# Patient Record
Sex: MALE | URBAN - METROPOLITAN AREA
[De-identification: names, ages, dates, MRNs, and addresses within clinical notes are randomized per-mention and may not be internally consistent; named-entity substitution may affect disease eponyms.]

---

## 2017-04-06 ENCOUNTER — EMERGENCY (EMERGENCY)
Facility: HOSPITAL | Age: 32
LOS: 0 days | Discharge: HOME | End: 2017-04-06
Attending: EMERGENCY MEDICINE | Admitting: EMERGENCY MEDICINE
Payer: COMMERCIAL

## 2017-04-06 VITALS
TEMPERATURE: 98 F | SYSTOLIC BLOOD PRESSURE: 138 MMHG | WEIGHT: 225.09 LBS | HEART RATE: 74 BPM | DIASTOLIC BLOOD PRESSURE: 80 MMHG | HEIGHT: 68 IN | OXYGEN SATURATION: 100 % | RESPIRATION RATE: 17 BRPM

## 2017-04-06 DIAGNOSIS — L08.9 LOCAL INFECTION OF THE SKIN AND SUBCUTANEOUS TISSUE, UNSPECIFIED: ICD-10-CM

## 2017-04-06 DIAGNOSIS — M79.644 PAIN IN RIGHT FINGER(S): ICD-10-CM

## 2017-04-06 PROCEDURE — 73130 X-RAY EXAM OF HAND: CPT | Mod: 26,RT

## 2017-04-06 PROCEDURE — 99282 EMERGENCY DEPT VISIT SF MDM: CPT

## 2017-04-06 RX ADMIN — Medication 1 TABLET(S): at 15:05

## 2017-04-06 NOTE — ED STATDOCS - OBJECTIVE STATEMENT
30 y/o M presents to ED c/o finger injury x2 weeks to right third digit. Was scratched by a cat 2 weeks ago and states he doesn't feel like his finger is healing. Unsure if there is a splinter in the wound. Denies fever and has no other constitutional complaints at this time.

## 2017-04-06 NOTE — ED STATDOCS - NS ED MD SCRIBE ATTENDING SCRIBE SECTIONS
PAST MEDICAL/SURGICAL/SOCIAL HISTORY/PHYSICAL EXAM/PROGRESS NOTE/REVIEW OF SYSTEMS/DISPOSITION/RESULTS/CONSULTATIONS/SHIFT CHANGE/HISTORY OF PRESENT ILLNESS

## 2017-04-06 NOTE — ED STATDOCS - ATTENDING CONTRIBUTION TO CARE
I performed the initial face to face bedside interview with this patient regarding history of present illness, review of symptoms and past medical, social and family history.  I completed an independent physical examination.  I was the initial provider who evaluated this patient.  The history, review of symptoms and examination was documented by the PA in my presence and I attest to the accuracy of the documentation.  I have signed out the follow up of any pending tests (i.e. labs, radiological studies) to the PA.  I have discussed the patient’s plan of care and disposition with the PA.     I personally performed the services described in the documentation, reviewed the documentation recorded by the scribe in my presence and it accurately and completely records my words and action.

## 2017-04-06 NOTE — ED ADULT NURSE NOTE - OBJECTIVE STATEMENT
Pt states he was scratched by a cat several weeks ago and the area, R 3rd finger is still bothering him

## 2017-04-06 NOTE — ED STATDOCS - MEDICAL DECISION MAKING DETAILS
wound from cat scratch, r/o FB, treat infection wound from cat scratch, r/o FB, treat infection    No foreign body on exam and XR.  Pt okay for d/c home.  Rx abx.  F/u with PCP in 1 week.  Return precautions given.  Pt understands and agrees with plan.

## 2019-05-02 ENCOUNTER — EMERGENCY (EMERGENCY)
Facility: HOSPITAL | Age: 34
LOS: 0 days | Discharge: HOME | End: 2019-05-02
Attending: EMERGENCY MEDICINE | Admitting: EMERGENCY MEDICINE
Payer: COMMERCIAL

## 2019-05-02 VITALS
WEIGHT: 205.03 LBS | HEART RATE: 101 BPM | DIASTOLIC BLOOD PRESSURE: 90 MMHG | TEMPERATURE: 98 F | OXYGEN SATURATION: 100 % | SYSTOLIC BLOOD PRESSURE: 150 MMHG | HEIGHT: 68 IN | RESPIRATION RATE: 19 BRPM
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
## 2019-05-02 DIAGNOSIS — R07.9 CHEST PAIN, UNSPECIFIED: ICD-10-CM

## 2019-05-02 DIAGNOSIS — M54.9 DORSALGIA, UNSPECIFIED: ICD-10-CM

## 2019-05-02 DIAGNOSIS — S89.82XA OTHER SPECIFIED INJURIES OF LEFT LOWER LEG, INITIAL ENCOUNTER: ICD-10-CM

## 2019-05-02 DIAGNOSIS — M25.532 PAIN IN LEFT WRIST: ICD-10-CM

## 2019-05-02 DIAGNOSIS — S90.32XA CONTUSION OF LEFT FOOT, INITIAL ENCOUNTER: ICD-10-CM

## 2019-05-02 PROCEDURE — 99283 EMERGENCY DEPT VISIT LOW MDM: CPT

## 2019-05-02 PROCEDURE — 73630 X-RAY EXAM OF FOOT: CPT | Mod: 26,LT

## 2019-05-02 PROCEDURE — 73110 X-RAY EXAM OF WRIST: CPT | Mod: 26,LT

## 2019-05-02 PROCEDURE — 71046 X-RAY EXAM CHEST 2 VIEWS: CPT | Mod: 26

## 2019-05-02 RX ORDER — IBUPROFEN 200 MG
600 TABLET ORAL ONCE
Qty: 0 | Refills: 0 | Status: COMPLETED | OUTPATIENT
Start: 2019-05-02 | End: 2019-05-02

## 2019-05-02 RX ADMIN — Medication 600 MILLIGRAM(S): at 21:50
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
## 2019-05-02 NOTE — ED STATDOCS - ATTENDING CONTRIBUTION TO CARE
Attending Contribution to Care: I, Angie Espana, performed the initial face to face bedside interview with this patient regarding history of present illness, review of symptoms and relevant past medical, social and family history.  I completed an independent physical examination.  I was the initial provider who evaluated this patient. I have signed out the follow up of any pending tests (i.e. labs, radiological studies) to the ACP.  I have communicated the patient’s plan of care and disposition with the ACP.

## 2019-05-02 NOTE — ED STATDOCS - CLINICAL SUMMARY MEDICAL DECISION MAKING FREE TEXT BOX
Plan for CXR, left wrist x-ray and left foot x-ray. Pain control. Plan for CXR, left wrist x-ray and left foot x-ray. Pain control.    No fx on xray, symptom care at home reviewed with patient.

## 2019-05-02 NOTE — ED STATDOCS - PROGRESS NOTE DETAILS
Patient seen and evaluated with ED attending at intake.  No acute fracture on xray.  Reviewed symptom care at home and PMD f/u -Kaila Sheldon PA-C
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
## 2020-10-15 ENCOUNTER — TRANSCRIPTION ENCOUNTER (OUTPATIENT)
Age: 35
End: 2020-10-15

## 2023-07-07 ENCOUNTER — EMERGENCY (EMERGENCY)
Facility: HOSPITAL | Age: 38
LOS: 0 days | Discharge: HOME | End: 2023-07-08
Attending: EMERGENCY MEDICINE
Payer: OTHER MISCELLANEOUS

## 2023-07-07 VITALS — WEIGHT: 229.94 LBS | HEIGHT: 68 IN
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
## 2023-07-07 DIAGNOSIS — M25.511 PAIN IN RIGHT SHOULDER: ICD-10-CM
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
## 2023-07-07 DIAGNOSIS — J45.909 UNSPECIFIED ASTHMA, UNCOMPLICATED: ICD-10-CM

## 2023-07-07 DIAGNOSIS — S43.401A UNSPECIFIED SPRAIN OF RIGHT SHOULDER JOINT, INITIAL ENCOUNTER: ICD-10-CM

## 2023-07-07 PROCEDURE — 99283 EMERGENCY DEPT VISIT LOW MDM: CPT | Mod: 25

## 2023-07-07 PROCEDURE — 99284 EMERGENCY DEPT VISIT MOD MDM: CPT

## 2023-07-07 PROCEDURE — 73030 X-RAY EXAM OF SHOULDER: CPT | Mod: RT

## 2023-07-07 PROCEDURE — 99053 MED SERV 10PM-8AM 24 HR FAC: CPT

## 2023-07-07 NOTE — ED ADULT TRIAGE NOTE - CHIEF COMPLAINT QUOTE
Pt came into the ED with c/o right sided shoulder pain. Pt reports he lifted a 50 pound box at work and heard a "crackling noise". Pt endorses the pain is a 5/10 at this time. Pt has hx of Asthma. Pt denies any SOB or CP.

## 2023-07-08 VITALS
RESPIRATION RATE: 16 BRPM | HEART RATE: 71 BPM | TEMPERATURE: 98 F | SYSTOLIC BLOOD PRESSURE: 141 MMHG | DIASTOLIC BLOOD PRESSURE: 81 MMHG | OXYGEN SATURATION: 99 %

## 2023-07-08 PROCEDURE — 73030 X-RAY EXAM OF SHOULDER: CPT | Mod: 26,RT

## 2023-07-08 NOTE — ED PROVIDER NOTE - PROGRESS NOTE DETAILS
Attending Bullard, pt and wife updates on xray.  plan sling and d/c and follow up ortho Attending Bullard, pt and wife updates on xray.  Probable calcific tendonitis.  plan sling and d/c and follow up ortho

## 2023-07-08 NOTE — ED PROVIDER NOTE - MUSCULOSKELETAL, MLM
Spine appears normal, range of motion is not limited, Pain on palpation to right shoulder, no step off or deformity Spine appears normal, range of motion is not limited, Pain on palpation to right shoulder, no step off or deformity, distal n/v/m intact

## 2023-07-08 NOTE — ED ADULT NURSE NOTE - NSFALLUNIVINTERV_ED_ALL_ED
Bed/Stretcher in lowest position, wheels locked, appropriate side rails in place/Call bell, personal items and telephone in reach/Instruct patient to call for assistance before getting out of bed/chair/stretcher/Non-slip footwear applied when patient is off stretcher/[place] to call system/Physically safe environment - no spills, clutter or unnecessary equipment/Purposeful proactive rounding/Room/bathroom lighting operational, light cord in reach

## 2023-07-08 NOTE — ED PROVIDER NOTE - PATIENT PORTAL LINK FT
You can access the FollowMyHealth Patient Portal offered by [facility] by registering at the following website: [email or web address]. By joining [facility]’s FollowMyHealth portal, you will also be able to view your health information using other applications (apps) compatible with our system.

## 2023-07-08 NOTE — ED PROVIDER NOTE - OBJECTIVE STATEMENT
39 y/o male w/ a PMHx of asthma presents c/o right shoulder pain. Pt reports he was trying to move pallets of chicken weighing approx 50lb, had to move it over other objects when he heard a "crackling noise" from his right shoulder w/ associated pain. Pt endorses pain is currently improved. Pt noted he did hurt his right shoulder when he was 13. No other complaints at this time. 39 y/o male w/ a PMHx of asthma presents c/o right shoulder pain. Pt reports he was trying to move box of chicken weighing approx 50lb, had to move it over other objects when he heard a "crackling noise" from his right shoulder w/ associated pain. Pt endorses pain is currently improved. Pt noted he did hurt his right shoulder when he was 13. No other complaints at this time.

## 2023-07-08 NOTE — ED PROVIDER NOTE - NSFOLLOWUPINSTRUCTIONS_ED_ALL_ED_FT
Please call and follow-up with orthopedic doctors in 3 to 5 days    Use Tylenol (acetaminophen) 1000mg every 6 hours and Motrin (Ibuprofen) 600 mg every 6 hours as need for fever/pain.    Return to the Emergency Department for worsening or persistent symptoms, and/or ANY NEW OR CONCERNING SYMPTOMS. If you have issues obtaining follow up, please call: [phone] ([phone] or [phone]  to obtain a doctor or specialist who takes your insurance in your area.    Shoulder Sprain  Body outline showing the skeleton, with a close-up of ligaments in the shoulder.  A shoulder sprain is a partial or complete tear in one of the tough, fiber-like tissues (ligaments) in the shoulder. The ligaments in the shoulder help to hold the shoulder in place.    What are the causes?  This condition may be caused by:  A fall.  A hit to the shoulder.  A twist of the arm.  What increases the risk?  You are more likely to develop this condition if you:  Play sports.  Have problems with balance or coordination.  What are the signs or symptoms?  Symptoms of this condition include:  Pain when moving the shoulder.  Limited ability to move the shoulder.  Swelling and tenderness on top of the shoulder.  Warmth in the shoulder.  A change in the shape of the shoulder.  Redness or bruising on the shoulder.  How is this diagnosed?  This condition is diagnosed with:  A physical exam. During the exam, you may be asked to do simple exercises with your shoulder.  Imaging tests such as X-rays, MRI, or a CT scan. These tests can show how severe the sprain is.  How is this treated?  This condition may be treated with:  Rest.  Pain medicine.  Ice.  A sling or brace. This is used to keep the arm still while the shoulder is healing.  Physical therapy or rehabilitation exercises. These help to improve the range of motion and strength of the shoulder.  Surgery (rare). Surgery may be needed if the sprain caused a joint to become unstable. Surgery may also be needed to reduce pain.  Some people may develop ongoing shoulder pain or lose some range of motion in the shoulder. However, most people do not develop long-term problems.    Follow these instructions at home:  Bag of ice on a towel on the skin.  If you have a sling or brace:    Wear the sling or brace as told by your health care provider. Remove it only as told by your health care provider.  Loosen the sling or brace if your fingers tingle, become numb, or turn cold and blue.  Keep the sling or brace clean.  If the sling or brace is not waterproof:  Do not let it get wet.  Cover it with a watertight covering when you take a bath or shower.  Activity    Rest your shoulder.  Move your arm only as much as told by your health care provider, but move your hand and fingers often to prevent stiffness and swelling.  Return to your normal activities as told by your health care provider. Ask your health care provider what activities are safe for you.  Ask your health care provider when it is safe for you to drive if you have a sling or brace on your shoulder.  If you were shown how to do any exercises, do them as told by your health care provider.  General instructions    If directed, put ice on the affected area.  Put ice in a plastic bag.  Place a towel between your skin and the bag.  Leave the ice on for 20 minutes, 2–3 times a day.  Take over-the-counter and prescription medicines only as told by your health care provider.  Do not use any products that contain nicotine or tobacco, such as cigarettes, e-cigarettes, and chewing tobacco. These can delay healing. If you need help quitting, ask your health care provider.  Keep all follow-up visits as told by your health care provider. This is important.  Contact a health care provider if:  Your pain gets worse.  Your pain is not relieved with medicines.  You have increased redness or swelling.  Get help right away if:  You have a fever.  You cannot move your arm or shoulder.  You develop severe numbness or tingling in your arm, hand, or fingers.  Your arm, hand, or fingers feel cold and turn blue, white, or gray.  Summary  A shoulder sprain is a partial or complete tear in one of the tough, fiber-like tissues (ligaments) in the shoulder.  This condition may be caused by a fall, a hit to the shoulder, or a twist of the arm.  Treatment usually includes rest, ice, and pain medicine as needed.  If you have a sling or brace, wear it as told by your health care provider. Remove it only as told by your health care provider.  This information is not intended to replace advice given to you by your health care provider. Make sure you discuss any questions you have with your health care provider.    How to Use a Sling    WHAT YOU NEED TO KNOW:    A sling is a strong fabric loop that hangs from your neck to support your arm. Your arm, bent at the elbow, rests in the sling. Some slings have a strap that goes down your back to take the weight off your neck. This strap is connected to the elbow side of the sling. Your healthcare provider will help you decide which sling is best for you and where you can get one. A sling helps prevent your hand, arm, and shoulder from moving so your injury can heal. A sling can also help if you have a heavy cast on your arm.Shoulder Sling         DISCHARGE INSTRUCTIONS:    How to use a sling: Your healthcare provider will teach you how to put on your sling. Follow the directions below to help you put on the sling at home:     Gently bend your injured arm at the elbow and hold it in front of you, across your body. Your thumb should be pointing up.      Put the strap of the sling around your neck. The strap usually has an adhesive fabric to make it easy for you to fasten the strap.      Put your arm in the sling so your elbow is in the closed end of the sling. Your hand will be at the open end of the sling.      Put the edge of the sling so it covers the first fold of the little finger.       Wiggle your fingers as directed to prevent stiffness in your hand.

## 2023-07-11 PROBLEM — Z00.00 ENCOUNTER FOR PREVENTIVE HEALTH EXAMINATION: Status: ACTIVE | Noted: 2023-07-11

## 2023-07-12 ENCOUNTER — EMERGENCY (EMERGENCY)
Facility: HOSPITAL | Age: 38
LOS: 0 days | Discharge: HOME | End: 2023-07-12
Attending: EMERGENCY MEDICINE
Payer: OTHER MISCELLANEOUS

## 2023-07-12 VITALS
OXYGEN SATURATION: 99 % | RESPIRATION RATE: 18 BRPM | SYSTOLIC BLOOD PRESSURE: 144 MMHG | HEART RATE: 83 BPM | DIASTOLIC BLOOD PRESSURE: 95 MMHG | TEMPERATURE: 98 F

## 2023-07-12 VITALS — HEIGHT: 68 IN | WEIGHT: 229.94 LBS

## 2023-07-12 DIAGNOSIS — M25.511 PAIN IN RIGHT SHOULDER: ICD-10-CM
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
## 2023-07-12 PROCEDURE — 73030 X-RAY EXAM OF SHOULDER: CPT | Mod: 26,RT

## 2023-07-12 PROCEDURE — 99283 EMERGENCY DEPT VISIT LOW MDM: CPT | Mod: 25

## 2023-07-12 PROCEDURE — 99284 EMERGENCY DEPT VISIT MOD MDM: CPT

## 2023-07-12 PROCEDURE — 73030 X-RAY EXAM OF SHOULDER: CPT | Mod: RT

## 2023-07-12 RX ORDER — IBUPROFEN 200 MG
600 TABLET ORAL ONCE
Refills: 0 | Status: COMPLETED | OUTPATIENT
Start: 2023-07-12 | End: 2023-07-12

## 2023-07-12 RX ADMIN — Medication 600 MILLIGRAM(S): at 19:59

## 2023-07-12 NOTE — ED STATDOCS - WR ORDER STATUS 1
ACP referral received. Pos reviewing chart, I contacted pt. He remembers the beginning conversation with the outreach RN. He agreed to received ACP materials to review via his email address which I confirmed. I will touch base in 1-2 weeks if I do not receive a response from pt. Clarified his pt contact being Ms. Niki Kramer.   Dorcas Naik LCSW Performed

## 2023-07-12 NOTE — ED STATDOCS - PATIENT PORTAL LINK FT
You can access the FollowMyHealth Patient Portal offered by [facility] by registering at the following website: [email or web address]. By joining [facility]’s FollowMyHealth portal, you will also be able to view your health information using other applications (apps) compatible with our system.

## 2023-07-12 NOTE — ED STATDOCS - OBJECTIVE STATEMENT
39 y/o male with no pertinent PMHx presents to the ED c/o right shoulder pain. Patient was seen in ED 5 days ago for shoulder injury; has appointment with orthopedist Monday 7/17, but was told to return to ED if pain persisted/worsened. States he feels his shoulder "click" with movement.

## 2023-07-12 NOTE — ED STATDOCS - NSFOLLOWUPINSTRUCTIONS_ED_ALL_ED_FT
Shoulder Pain  Many things can cause shoulder pain, including:  An injury to the shoulder.  Overuse of the shoulder.  Arthritis.  The source of the pain can be:  Inflammation.  An injury to the shoulder joint.  An injury to a tendon, ligament, or bone.  Follow these instructions at home:  Pay attention to changes in your symptoms. Let your health care provider know about them. Follow these instructions to relieve your pain.    If you have a sling:    Wear the sling as told by your health care provider. Remove it only as told by your health care provider.  Loosen the sling if your fingers tingle, become numb, or turn cold and blue.  Keep the sling clean.  If the sling is not waterproof:  Do not let it get wet. Remove it to shower or bathe.  Move your arm as little as possible, but keep your hand moving to prevent swelling.  Managing pain, stiffness, and swelling    Bag of ice on a towel on the skin.  If directed, put ice on the painful area:  Put ice in a plastic bag.  Place a towel between your skin and the bag.  Leave the ice on for 20 minutes, 2–3 times per day. Stop applying ice if it does not help with the pain.  Squeeze a soft ball or a foam pad as much as possible. This helps to keep the shoulder from swelling. It also helps to strengthen the arm.  General instructions    Take over-the-counter and prescription medicines only as told by your health care provider.  Keep all follow-up visits as told by your health care provider. This is important.  Contact a health care provider if:  Your pain gets worse.  Your pain is not relieved with medicines.  New pain develops in your arm, hand, or fingers.  Get help right away if:  Your arm, hand, or fingers:  Tingle.  Become numb.  Become swollen.  Become painful.  Turn white or blue.  Summary  Shoulder pain can be caused by an injury, overuse, or arthritis.  Pay attention to changes in your symptoms. Let your health care provider know about them.  This condition may be treated with a sling, ice, and pain medicines.  Contact your health care provider if the pain gets worse or new pain develops. Get help right away if your arm, hand, or fingers tingle or become numb, swollen, or painful.  Keep all follow-up visits as told by your health care provider. This is important.  This information is not intended to replace advice given to you by your health care provider. Make sure you discuss any questions you have with your health care provider.

## 2023-07-12 NOTE — ED STATDOCS - PROGRESS NOTE DETAILS
XR negative pt has an ortho appt scheduled next week will dc home with outpt f/u  Clementine Galeas PA-C Patient seen and evaluated, ED attending note and orders reviewed, will continue with patient follow up and care -Clementine Galeas PA-C

## 2023-07-12 NOTE — ED ADULT TRIAGE NOTE - CHIEF COMPLAINT QUOTE
Pt states he has right shoulder pain since lifting a heavy 50 pound box over something. Pt was seen in ED on Friday and was advised to come back to ED if pain continued. Pt hears clicking in right shoulder when he  moves his arm. Pt states he is a  and has to move his arms around a lot.

## 2023-07-12 NOTE — ED ADULT NURSE NOTE - OBJECTIVE STATEMENT
Pt presents to ED from home with complaints of right shoulder pain, 8/10, with activity. Pt states he was seen in ED on Friday for similar pain/injury after working that day. Pt denies taking anything for pain prior to coming to ED. PMS x4 extremities noted. Administered ibuprofen as per order. No additional requests or complaints. Pending xray results. Patient safety maintained. Will continiue to monitor.

## 2023-07-12 NOTE — ED ADULT NURSE NOTE - NSFALLUNIVINTERV_ED_ALL_ED
Bed/Stretcher in lowest position, wheels locked, appropriate side rails in place/Call bell, personal items and telephone in reach/Instruct patient to call for assistance before getting out of bed/chair/stretcher/Non-slip footwear applied when patient is off stretcher/[place] to call system/Physically safe environment - no spills, clutter or unnecessary equipment/Purposeful proactive rounding/Room/bathroom lighting operational, light cord in reach

## 2023-07-19 ENCOUNTER — APPOINTMENT (OUTPATIENT)
Dept: ORTHOPEDIC SURGERY | Facility: CLINIC | Age: 38
End: 2023-07-19
Payer: OTHER MISCELLANEOUS

## 2023-07-19 VITALS
HEART RATE: 69 BPM | SYSTOLIC BLOOD PRESSURE: 128 MMHG | DIASTOLIC BLOOD PRESSURE: 87 MMHG | BODY MASS INDEX: 34.86 KG/M2 | WEIGHT: 230 LBS | HEIGHT: 68 IN

## 2023-07-19 DIAGNOSIS — Z78.9 OTHER SPECIFIED HEALTH STATUS: ICD-10-CM

## 2023-07-19 PROCEDURE — 99203 OFFICE O/P NEW LOW 30 MIN: CPT | Mod: 25

## 2023-07-19 PROCEDURE — 20610 DRAIN/INJ JOINT/BURSA W/O US: CPT | Mod: RT

## 2023-07-25 PROBLEM — Z78.9 NO PERTINENT PAST MEDICAL HISTORY: Status: RESOLVED | Noted: 2023-07-25 | Resolved: 2023-07-25

## 2023-07-25 NOTE — PROCEDURE
[de-identified] : Indication:  Impingement of the Right Shoulder\par \par Consent: \par At this time, I have recommended an injection to the right shoulder.  The risks and benefits of the procedure were discussed with the patient in detail.  Upon verbal consent of the patient, we proceeded with the injection as noted below.  \par \par Procedure:  \par After a sterile prep, the patient underwent an injection of 9 cc of 1% Lidocaine (10mg/mL) without epinephrine and 1 cc of Kenalog (40mg/mL) into the right shoulder.  The patient tolerated the procedure well.  There were no complications.  \par \par : [employer]par Drug name:     Triamcinolone Acetonide Injectable Suspension USP\par NDC #:            5750-0942-23\par Lot #:              [id number]\par Expiration:      01/2024

## 2023-07-25 NOTE — DISCUSSION/SUMMARY
[de-identified] : At this time, due to impingement of the right shoulder, I recommend ice and elevation. He will be reassessed in three to four weeks.\par \par The Workers' Compensation guidelines have been followed.\par

## 2023-07-25 NOTE — HISTORY OF PRESENT ILLNESS
[de-identified] : The patient comes in today with complaints of pain to his right shoulder. He states that on July 17, 2023, he was working at a [employer], was lifting some chicken up and over and felt crunching and pain to his right shoulder. He hasn't been able to work since.

## 2023-07-25 NOTE — ADDENDUM
[FreeTextEntry1] : This note was written by Mayra Cloud on 07/24/2023 acting as a scribe for TEODORA MCNEAL III, MD

## 2023-07-25 NOTE — PHYSICAL EXAM
[de-identified] : Right Shoulder:  \par Range of Motion in Degrees:	\par 	                                  Claimant:	Normal:	\par Abduction (Active)	                  180	               180 degrees	\par Abduction (Passive)	  180	               180 degrees	\par Forward elevation (Active):	  180	               180 degrees	\par Forward elevation (Passive):	  180	               180 degrees	\par External rotation (Active):	   45	               45 degrees	\par External rotation (Passive):	   45	               45 degrees	\par Internal rotation (Active):	   L-1	               L-1	\par Internal rotation (Passive):	   L-1	               L-1	\par  \par No motor weakness to internal rotation, external rotation or abduction in the scapular plane.  Negative crank test.  Negative O’Archie’s test.  Negative Speed’s test. Negative Yergason’s test.  Negative cross arm test.  No tenderness to palpation at the AC joint. Positive Hawkin’s sign.  Positive Neer’s sign. Negative apprehension. Negative sulcus sign.  No gross neurological or vascular deficits distally.  Skin is intact.  No rashes, scars or lesions. 2+ radial and ulnar pulses. No extra-articular swelling or tenderness.\par  \par Left Shoulder: \par Range of Motion in Degrees:   	\par    	                        Claimant:  	Normal:  	\par Abduction (Active)  	180  	180 degrees  	\par Abduction (Passive)  	180  	180 degrees  	\par Forward elevation (Active):  	180  	180 degrees  	\par Forward elevation (Passive):  180  	180 degrees  	\par External rotation (Active):  	45  	45 degrees  	\par External rotation (Passive):  	45  	45 degrees  	\par Internal rotation (Active):  	L-1  	L-1  	\par Internal rotation (Passive):  	L-1  	L-1  \par 	\par No motor weakness to internal rotation, external rotation or abduction in the scapular plane.  Negative crank test.  Negative O’Archie’s test.  Negative Speed’s test. Negative Yergason’s test.  Negative cross arm test.  No tenderness to palpation at the AC joint. Negative Hawkin’s sign.  Negative Neer’s sign.  Negative apprehension. Negative sulcus sign.  No gross neurological or vascular deficits distally.  Skin is intact.  No rashes, scars or lesions. 2+ radial and ulnar pulses. No extra-articular swelling or tenderness.\par   [de-identified] : Ambulating with a normal gait.  [de-identified] : Appearance:  Well-developed, well-nourished male in no acute distress.\par   [de-identified] : Outside radiographs were reviewed and reveal no obvious osseous abnormality.

## 2023-07-25 NOTE — REASON FOR VISIT
[Initial Visit] : an initial visit for [FreeTextEntry2] : the right shoulder. This visit is related to Workers' Compensation. Date of Injury: 07/17/2023

## 2023-08-09 ENCOUNTER — APPOINTMENT (OUTPATIENT)
Dept: ORTHOPEDIC SURGERY | Facility: CLINIC | Age: 38
End: 2023-08-09
Payer: OTHER MISCELLANEOUS

## 2023-08-09 VITALS
WEIGHT: 230 LBS | DIASTOLIC BLOOD PRESSURE: 88 MMHG | HEART RATE: 99 BPM | HEIGHT: 68 IN | BODY MASS INDEX: 34.86 KG/M2 | SYSTOLIC BLOOD PRESSURE: 136 MMHG

## 2023-08-09 PROCEDURE — 99213 OFFICE O/P EST LOW 20 MIN: CPT

## 2023-08-10 NOTE — PHYSICAL EXAM
[de-identified] : Right shoulder: Shoulder: Range of Motion in Degrees:	 	                                  Claimant:	Normal:	 Abduction (Active)	                  180	               180 degrees	 Abduction (Passive)	  180	               180 degrees	 Forward elevation (Active):	  180	               180 degrees	 Forward elevation (Passive):	  180	               180 degrees	 External rotation (Active):	   45	               45 degrees	 External rotation (Passive):	   45	               45 degrees	 Internal rotation (Active):	   L-1	               L-1	 Internal rotation (Passive):	   L-1	               L-1	   No motor weakness to internal rotation, external rotation or abduction in the scapular plane.  Negative crank test.  Negative OBriens test.  Negative Speeds test. Negative Yergasons test.  Negative cross arm test.  No tenderness to palpation at the AC joint. Positive Coto sign.  Positive Neers sign. Negative apprehension. Negative sulcus sign.  No gross neurological or vascular deficits distally.  Skin is intact.  No rashes, scars or lesions. 2+ radial and ulnar pulses. No extra-articular swelling or tenderness.   [de-identified] : Gait: Normal    Station: Normal  [de-identified] : Appearance: Well-developed, well-nourished male  in no acute distress.

## 2023-08-10 NOTE — HISTORY OF PRESENT ILLNESS
[de-identified] : Jonathon comes in today for his right shoulder.  He states he is definitely better but he occasionally feels a pop and then gets a little burn.

## 2023-08-10 NOTE — DISCUSSION/SUMMARY
[de-identified] : The patient presents with a resolving impingement, right shoulder.  At this time, start on therapy.  He will be reassessed in four weeks.  Of note, we will allow him to return to work at this point.  WE REQUEST AUTHORIZATION FOR PHYSICAL THERAPY FOR THE RIGHT SHOULDER.  The Workers' Compensation Guidelines have been followed.

## 2023-08-10 NOTE — REASON FOR VISIT
[Follow-Up Visit] : a follow-up visit for [FreeTextEntry2] : his right shoulder.  This visit is related to Workers' Compensation.

## 2023-08-10 NOTE — ADDENDUM
[FreeTextEntry1] : This note was written by Rocio Salgado on 08/10/2023 acting as scribe for Niraj Irizarry III, MD

## 2023-12-07 ENCOUNTER — APPOINTMENT (OUTPATIENT)
Dept: ORTHOPEDIC SURGERY | Facility: CLINIC | Age: 38
End: 2023-12-07
Payer: OTHER MISCELLANEOUS

## 2023-12-07 VITALS
TEMPERATURE: 98 F | HEART RATE: 87 BPM | WEIGHT: 230 LBS | SYSTOLIC BLOOD PRESSURE: 125 MMHG | DIASTOLIC BLOOD PRESSURE: 87 MMHG | HEIGHT: 68 IN | BODY MASS INDEX: 34.86 KG/M2

## 2023-12-07 PROCEDURE — 99213 OFFICE O/P EST LOW 20 MIN: CPT

## 2023-12-07 PROCEDURE — 73030 X-RAY EXAM OF SHOULDER: CPT | Mod: RT

## 2024-01-31 ENCOUNTER — APPOINTMENT (OUTPATIENT)
Dept: ORTHOPEDIC SURGERY | Facility: CLINIC | Age: 39
End: 2024-01-31
Payer: OTHER MISCELLANEOUS

## 2024-01-31 VITALS
HEART RATE: 89 BPM | WEIGHT: 230 LBS | SYSTOLIC BLOOD PRESSURE: 153 MMHG | DIASTOLIC BLOOD PRESSURE: 95 MMHG | BODY MASS INDEX: 34.86 KG/M2 | HEIGHT: 68 IN

## 2024-01-31 DIAGNOSIS — M75.41 IMPINGEMENT SYNDROME OF RIGHT SHOULDER: ICD-10-CM

## 2024-01-31 DIAGNOSIS — M19.011 PRIMARY OSTEOARTHRITIS, RIGHT SHOULDER: ICD-10-CM

## 2024-01-31 PROCEDURE — 99214 OFFICE O/P EST MOD 30 MIN: CPT

## 2024-01-31 NOTE — HISTORY OF PRESENT ILLNESS
[de-identified] : The patient comes in today for the right shoulder. We reviewed the MRI, as noted below.

## 2024-01-31 NOTE — PHYSICAL EXAM
[de-identified] : Right Shoulder:   Range of Motion in Degrees:	 	                                                      Claimant:	                   Normal:	 Abduction (Active)	                                  180	               180 degrees	 Abduction (Passive)	                          180	               180 degrees	 Forward elevation (Active):	                  180	               180 degrees	 Forward elevation (Passive):	                  180	               180 degrees	 External rotation (Active):	                   45	                         45 degrees	 External rotation (Passive):	                   45	                         45 degrees	 Internal rotation (Active):	                          L-1	                              L-1	 Internal rotation (Passive):	                  L-1	                              L-1	   Diffuse crepitations and tenderness throughout range of motion.  Pain and swelling throughout range of motion, more significant at extremes. No motor weakness to internal rotation, external rotation or abduction in the scapular plane.  Negative crank test.  Negative OBriens test.  Negative Speeds test. Negative Yergasons test.  Positive cross arm test. Positive tenderness to palpation at the AC joint. Positive Coto sign.  Positive Neers sign. Negative apprehension. Negative sulcus sign.  No gross neurological or vascular deficits distally.  Skin is intact.  No rashes, scars or lesions. 2+ radial and ulnar pulses. No extra-articular swelling or tenderness.  [de-identified] : Ambulating with a normal gait. [de-identified] : Appearance:  Well-developed, well-nourished male in no acute distress.   [de-identified] : Review of the MRI shows impingement and AC arthritis. There is evidence of posterior superior labral tearing, as well as degenerative changes of the glenohumeral joint.

## 2024-01-31 NOTE — REASON FOR VISIT
[Follow-Up Visit] : a follow-up visit for [FreeTextEntry2] : the right shoulder. This visit is related to Workers' Compensation. Date of Injury: 7/17/2023.

## 2024-01-31 NOTE — ADDENDUM
[FreeTextEntry1] : This note was written by Mayra Cloud on 01/31/2024 acting as a scribe for TEODORA MCNEAL III, MD

## 2024-01-31 NOTE — DISCUSSION/SUMMARY
[de-identified] : At this time, due to Impingement, AC arthritis and osteoarthritis of the right shoulder, I had a long discussion with him and since he has failed his conservative management and has failed to improve, I recommend he undergo a shoulder arthroscopy and decompression of the distal clavicle. I advised him we will assess the glenohumeral joint and potential labral debridement and/or chondroplasty. All the risks and benefits of the surgery, including, but not limited to, anesthesia, infection, nerve and/or vascular injury and need for further surgery, were all discussed with the patient at length.  In light of these, patient wishes to proceed.  Patient will be scheduled at this time.   THIS IS A FORMAL REQUEST FOR AUTHORIZATION FOR A RIGHT SHOULDER ARTHROSCOPY AND DECOMPRESSION OF THE DISTAL CLAVICLE.  The Workers' Compensation guidelines have been followed.

## 2024-06-18 NOTE — ED ADULT NURSE NOTE - NSFALLCONCLUSION_ED_ALL_ED
[employer] Safety Interventions I personally performed the service described in the documentation recorded by the scribe in my presence, and it accurately and completely records my words and actions. no

## 2024-09-17 ENCOUNTER — NON-APPOINTMENT (OUTPATIENT)
Age: 39
End: 2024-09-17
